# Patient Record
Sex: FEMALE | Race: WHITE | NOT HISPANIC OR LATINO | URBAN - METROPOLITAN AREA
[De-identification: names, ages, dates, MRNs, and addresses within clinical notes are randomized per-mention and may not be internally consistent; named-entity substitution may affect disease eponyms.]

---

## 2021-05-27 ENCOUNTER — OFFICE VISIT (OUTPATIENT)
Dept: URBAN - METROPOLITAN AREA CLINIC 35 | Facility: CLINIC | Age: 71
End: 2021-05-27

## 2021-06-01 ENCOUNTER — DASHBOARD ENCOUNTERS (OUTPATIENT)
Age: 71
End: 2021-06-01

## 2021-06-01 ENCOUNTER — OFFICE VISIT (OUTPATIENT)
Dept: URBAN - METROPOLITAN AREA CLINIC 35 | Facility: CLINIC | Age: 71
End: 2021-06-01

## 2021-06-01 VITALS
SYSTOLIC BLOOD PRESSURE: 99 MMHG | HEART RATE: 86 BPM | HEIGHT: 67 IN | BODY MASS INDEX: 17.58 KG/M2 | OXYGEN SATURATION: 97 % | WEIGHT: 112 LBS | DIASTOLIC BLOOD PRESSURE: 60 MMHG | TEMPERATURE: 98.2 F

## 2021-06-01 RX ORDER — THYROID, PORCINE 60 MG/1
1 TABLET ON AN EMPTY STOMACH TABLET ORAL ONCE A DAY
Qty: 30 | Status: ACTIVE | COMMUNITY

## 2021-06-01 NOTE — HPI-MIGRATED HPI
;   ;     Change in bowel habits : Patient admits recent onset of constipation 3 weeks ago when she had a bowel movement after one week. This was followed by diarrhea. Patient currently admits 2 bowel movements every other day with loose and formed stools.  Patient denies blood, mucus, or melena in stools.  Patient states that their previous bowel habits were 2 movements per day, with normal and formed stools. She denies taking any OTC medications for relief of symptoms.  Patient denies associated abdominal pains, bloating, and gas.  When a patient has a bowel movement she does feel like she is completely emptying their bowels. Patient denies fecal incontinence.  Patient denies recently drinking lake or well water, any changes in their medications, or taking any antibiotics in the last 6 months. Patient admits having a colonoscopy. ;   Colorectal Cancer Screening : 70 year old female who presents today for a colorectal cancer screening.  She has a family history of colon cancer with her paternal grandmother.  She had a colonoscopy performed (6/28/0213) with findings of mild diverticulosis in sigmoid colon.  Currently admits 2 bowel movement per day without strain. Denies blood, melena or mucus in stools.  Denies rectal pain/bleeding or pruritus ani.  ;

## 2021-06-02 PROBLEM — 129851009: Status: ACTIVE | Noted: 2021-06-02

## 2021-06-02 PROBLEM — 429006005 FAMILY HISTORY OF MALIGNANT NEOPLASM OF GASTROINTESTINAL TRACT: Status: ACTIVE | Noted: 2021-06-02
